# Patient Record
Sex: FEMALE | Race: WHITE | Employment: OTHER | URBAN - METROPOLITAN AREA
[De-identification: names, ages, dates, MRNs, and addresses within clinical notes are randomized per-mention and may not be internally consistent; named-entity substitution may affect disease eponyms.]

---

## 2019-02-26 ENCOUNTER — OFFICE VISIT (OUTPATIENT)
Dept: FAMILY MEDICINE CLINIC | Age: 44
End: 2019-02-26
Payer: COMMERCIAL

## 2019-02-26 VITALS
SYSTOLIC BLOOD PRESSURE: 132 MMHG | OXYGEN SATURATION: 99 % | DIASTOLIC BLOOD PRESSURE: 70 MMHG | WEIGHT: 267 LBS | HEART RATE: 74 BPM | HEIGHT: 64 IN | TEMPERATURE: 98.1 F | BODY MASS INDEX: 45.58 KG/M2

## 2019-02-26 DIAGNOSIS — Z79.899 CURRENT USE OF PROTON PUMP INHIBITOR: ICD-10-CM

## 2019-02-26 DIAGNOSIS — Z13.220 SCREENING FOR LIPID DISORDERS: ICD-10-CM

## 2019-02-26 DIAGNOSIS — Z12.39 SCREENING FOR BREAST CANCER: ICD-10-CM

## 2019-02-26 DIAGNOSIS — Z00.00 ROUTINE GENERAL MEDICAL EXAMINATION AT A HEALTH CARE FACILITY: ICD-10-CM

## 2019-02-26 DIAGNOSIS — Z12.4 SCREENING FOR CERVICAL CANCER: ICD-10-CM

## 2019-02-26 DIAGNOSIS — Z76.89 ESTABLISHING CARE WITH NEW DOCTOR, ENCOUNTER FOR: ICD-10-CM

## 2019-02-26 DIAGNOSIS — M06.9 RHEUMATOID ARTHRITIS INVOLVING MULTIPLE SITES, UNSPECIFIED RHEUMATOID FACTOR PRESENCE: Primary | ICD-10-CM

## 2019-02-26 DIAGNOSIS — E66.01 MORBID OBESITY WITH BMI OF 45.0-49.9, ADULT (HCC): ICD-10-CM

## 2019-02-26 PROCEDURE — G8417 CALC BMI ABV UP PARAM F/U: HCPCS | Performed by: NURSE PRACTITIONER

## 2019-02-26 PROCEDURE — 99214 OFFICE O/P EST MOD 30 MIN: CPT | Performed by: NURSE PRACTITIONER

## 2019-02-26 RX ORDER — HYDROCODONE BITARTRATE AND ACETAMINOPHEN 5; 325 MG/1; MG/1
1 TABLET ORAL EVERY 6 HOURS PRN
COMMUNITY

## 2019-02-26 RX ORDER — LANOLIN ALCOHOL/MO/W.PET/CERES
3 CREAM (GRAM) TOPICAL DAILY
COMMUNITY

## 2019-02-26 RX ORDER — DIPHENHYDRAMINE HCL 50 MG
50 CAPSULE ORAL EVERY 6 HOURS PRN
COMMUNITY

## 2019-02-26 RX ORDER — OMEPRAZOLE 20 MG/1
20 CAPSULE, DELAYED RELEASE ORAL DAILY
COMMUNITY

## 2019-02-26 RX ORDER — PREDNISONE 10 MG/1
10 TABLET ORAL DAILY
COMMUNITY

## 2019-02-26 RX ORDER — CELECOXIB 200 MG/1
200 CAPSULE ORAL 2 TIMES DAILY
COMMUNITY

## 2019-02-26 ASSESSMENT — ENCOUNTER SYMPTOMS
CONSTIPATION: 0
VOMITING: 0
COUGH: 0
CHEST TIGHTNESS: 0
DIARRHEA: 0
APNEA: 0
NAUSEA: 0
ABDOMINAL PAIN: 0
BLOOD IN STOOL: 0
SHORTNESS OF BREATH: 0

## 2019-02-26 ASSESSMENT — PATIENT HEALTH QUESTIONNAIRE - PHQ9
SUM OF ALL RESPONSES TO PHQ QUESTIONS 1-9: 0
2. FEELING DOWN, DEPRESSED OR HOPELESS: 0
SUM OF ALL RESPONSES TO PHQ QUESTIONS 1-9: 0
SUM OF ALL RESPONSES TO PHQ9 QUESTIONS 1 & 2: 0
1. LITTLE INTEREST OR PLEASURE IN DOING THINGS: 0

## 2019-03-07 LAB
ALBUMIN SERPL-MCNC: 4.1 G/DL
ALP BLD-CCNC: 67 U/L
ALT SERPL-CCNC: 13 U/L
ANION GAP SERPL CALCULATED.3IONS-SCNC: NORMAL MMOL/L
AST SERPL-CCNC: 16 U/L
BASOPHILS ABSOLUTE: NORMAL /ΜL
BASOPHILS RELATIVE PERCENT: NORMAL %
BILIRUB SERPL-MCNC: 0.9 MG/DL (ref 0.1–1.4)
BUN BLDV-MCNC: 13 MG/DL
C-REACTIVE PROTEIN: 1.2
CALCIUM SERPL-MCNC: 9.4 MG/DL
CHLORIDE BLD-SCNC: 104 MMOL/L
CHOLESTEROL, FASTING: 174
CO2: 26 MMOL/L
CREAT SERPL-MCNC: 0.54 MG/DL
EOSINOPHILS ABSOLUTE: NORMAL /ΜL
EOSINOPHILS RELATIVE PERCENT: NORMAL %
GFR CALCULATED: NORMAL
GLUCOSE BLD-MCNC: 103 MG/DL
HCT VFR BLD CALC: NORMAL % (ref 36–46)
HDLC SERPL-MCNC: 69 MG/DL (ref 35–70)
HEMOGLOBIN: NORMAL G/DL (ref 12–16)
LDL CHOLESTEROL CALCULATED: 85 MG/DL (ref 0–160)
LYMPHOCYTES ABSOLUTE: NORMAL /ΜL
LYMPHOCYTES RELATIVE PERCENT: NORMAL %
MAGNESIUM: 1.9 MG/DL
MCH RBC QN AUTO: NORMAL PG
MCHC RBC AUTO-ENTMCNC: NORMAL G/DL
MCV RBC AUTO: NORMAL FL
MONOCYTES ABSOLUTE: NORMAL /ΜL
MONOCYTES RELATIVE PERCENT: NORMAL %
NEUTROPHILS ABSOLUTE: NORMAL /ΜL
NEUTROPHILS RELATIVE PERCENT: NORMAL %
PLATELET # BLD: NORMAL K/ΜL
PMV BLD AUTO: NORMAL FL
POTASSIUM SERPL-SCNC: 4.1 MMOL/L
RBC # BLD: NORMAL 10^6/ΜL
RHEUMATOID FACTOR: 98
SEDIMENTATION RATE, ERYTHROCYTE: 41
SODIUM BLD-SCNC: 141 MMOL/L
TOTAL PROTEIN: 7.1
TRIGLYCERIDE, FASTING: 101
VITAMIN B-12: 321
WBC # BLD: NORMAL 10^3/ML

## 2019-03-10 DIAGNOSIS — Z13.220 SCREENING FOR LIPID DISORDERS: ICD-10-CM

## 2019-03-10 DIAGNOSIS — Z79.899 CURRENT USE OF PROTON PUMP INHIBITOR: ICD-10-CM

## 2019-03-10 DIAGNOSIS — Z00.00 ROUTINE GENERAL MEDICAL EXAMINATION AT A HEALTH CARE FACILITY: ICD-10-CM

## 2019-03-10 DIAGNOSIS — M06.9 RHEUMATOID ARTHRITIS INVOLVING MULTIPLE SITES, UNSPECIFIED RHEUMATOID FACTOR PRESENCE: ICD-10-CM

## 2019-03-12 ENCOUNTER — HOSPITAL ENCOUNTER (OUTPATIENT)
Dept: MAMMOGRAPHY | Age: 44
Discharge: HOME | End: 2019-03-14
Payer: COMMERCIAL

## 2019-03-12 DIAGNOSIS — Z12.39 SCREENING FOR BREAST CANCER: ICD-10-CM

## 2019-03-12 PROCEDURE — 77063 BREAST TOMOSYNTHESIS BI: CPT

## 2019-03-26 LAB — ANTIBODY: NORMAL

## 2019-05-14 ENCOUNTER — PATIENT MESSAGE (OUTPATIENT)
Dept: FAMILY MEDICINE CLINIC | Age: 44
End: 2019-05-14

## 2019-05-15 NOTE — TELEPHONE ENCOUNTER
From: Carol Thomson  To: Deedee Wooten, ARACELIS - CNP  Sent: 5/14/2019 7:36 PM EDT  Subject: Non-Urgent Medical Question    I need to see a doctor who can refill my prescription for hydrocodone.  Can you tell me what type of doctor would i need to see for this and if i need a referral?    Thank you,    Carol Thomson

## 2019-06-06 ENCOUNTER — PATIENT MESSAGE (OUTPATIENT)
Dept: FAMILY MEDICINE CLINIC | Age: 44
End: 2019-06-06

## 2019-06-06 NOTE — TELEPHONE ENCOUNTER
From: Donna Carney  To: Krista Gan, APRN - CNP  Sent: 6/6/2019 1:59 PM EDT  Subject: Non-Urgent Medical Question    I need help finding a doctor who can refill my pain medications. I currently have Tramadol and Hydrocodone. I will run out in July or August.     Can you refer me to the right doctor? My Rhuematologist always did these before, but my new one says to ask my primary for assistance.     Thank you,    Cherri Ricks

## 2019-06-06 NOTE — TELEPHONE ENCOUNTER
From: Solis Box  To: ARACELIS Farias CNP  Sent: 6/6/2019 2:27 PM EDT  Subject: Non-Urgent Medical Question    i do not know what to look for. I was told there are no pain management facilities covered. So I just need to go to a doctor that will be able to help me. I need the script so I will be able to continue vacuuming. At this point I will pay out of pocket to see a doctor. This insurance is useless     ----- Message -----  From: ARACELIS Farias CNP  Sent: 6/6/19, 2:20 PM  To: Solis Box  Subject: RE: Non-Urgent Medical Question    I will need you to call you insurance about whom is covered in [place] since you are more limited than others, please let me know and I will place a referral  olga    ----- Message -----   From: Solis Box   Sent: 6/6/2019 1:59 PM EDT   To: ARACELIS Farias CNP  Subject: Non-Urgent Medical Question    I need help finding a doctor who can refill my pain medications. I currently have Tramadol and Hydrocodone. I will run out in July or August.     Can you refer me to the right doctor? My Rhuematologist always did these before, but my new one says to ask my primary for assistance.     Thank you,    Aroldo Srivastava

## 2019-06-07 ENCOUNTER — PATIENT MESSAGE (OUTPATIENT)
Dept: FAMILY MEDICINE CLINIC | Age: 44
End: 2019-06-07

## 2019-07-08 ENCOUNTER — HOSPITAL ENCOUNTER (OUTPATIENT)
Age: 44
Setting detail: SPECIMEN
Discharge: HOME | End: 2019-07-08
Payer: COMMERCIAL

## 2019-07-08 LAB
ABSOLUTE EOS #: 0.19 K/UL (ref 0–0.44)
ABSOLUTE IMMATURE GRANULOCYTE: 0.05 K/UL (ref 0–0.3)
ABSOLUTE LYMPH #: 1.23 K/UL (ref 1.1–3.7)
ABSOLUTE MONO #: 0.71 K/UL (ref 0.1–1.2)
BASOPHILS # BLD: 1 % (ref 0–2)
BASOPHILS ABSOLUTE: 0.09 K/UL (ref 0–0.2)
DIFFERENTIAL TYPE: ABNORMAL
EOSINOPHILS RELATIVE PERCENT: 2 % (ref 1–4)
HCT VFR BLD CALC: 42 % (ref 36.3–47.1)
HEMOGLOBIN: 12.1 G/DL (ref 11.9–15.1)
IMMATURE GRANULOCYTES: 0 %
LYMPHOCYTES # BLD: 10 % (ref 24–43)
MCH RBC QN AUTO: 24.1 PG (ref 25.2–33.5)
MCHC RBC AUTO-ENTMCNC: 28.8 G/DL (ref 28.4–34.8)
MCV RBC AUTO: 83.7 FL (ref 82.6–102.9)
MONOCYTES # BLD: 6 % (ref 3–12)
NRBC AUTOMATED: 0 PER 100 WBC
PDW BLD-RTO: 16.2 % (ref 11.8–14.4)
PLATELET # BLD: 427 K/UL (ref 138–453)
PLATELET ESTIMATE: ABNORMAL
PMV BLD AUTO: 10.3 FL (ref 8.1–13.5)
RBC # BLD: 5.02 M/UL (ref 3.95–5.11)
RBC # BLD: ABNORMAL 10*6/UL
SEG NEUTROPHILS: 82 % (ref 36–65)
SEGMENTED NEUTROPHILS ABSOLUTE COUNT: 10.33 K/UL (ref 1.5–8.1)
WBC # BLD: 12.6 K/UL (ref 3.5–11.3)
WBC # BLD: ABNORMAL 10*3/UL

## 2019-07-09 LAB
ALBUMIN SERPL-MCNC: 4.2 G/DL (ref 3.5–5.2)
ALBUMIN/GLOBULIN RATIO: 1.3 (ref 1–2.5)
ALP BLD-CCNC: 92 U/L (ref 35–104)
ALT SERPL-CCNC: 19 U/L (ref 5–33)
ANION GAP SERPL CALCULATED.3IONS-SCNC: 12 MMOL/L (ref 9–17)
AST SERPL-CCNC: 22 U/L
BILIRUB SERPL-MCNC: 0.7 MG/DL (ref 0.3–1.2)
BUN BLDV-MCNC: 13 MG/DL (ref 6–20)
BUN/CREAT BLD: ABNORMAL (ref 9–20)
C-REACTIVE PROTEIN: 18.3 MG/L (ref 0–5)
CALCIUM SERPL-MCNC: 9.5 MG/DL (ref 8.6–10.4)
CHLORIDE BLD-SCNC: 100 MMOL/L (ref 98–107)
CO2: 25 MMOL/L (ref 20–31)
CREAT SERPL-MCNC: 0.42 MG/DL (ref 0.5–0.9)
GFR AFRICAN AMERICAN: >60 ML/MIN
GFR NON-AFRICAN AMERICAN: >60 ML/MIN
GFR SERPL CREATININE-BSD FRML MDRD: ABNORMAL ML/MIN/{1.73_M2}
GFR SERPL CREATININE-BSD FRML MDRD: ABNORMAL ML/MIN/{1.73_M2}
GLUCOSE BLD-MCNC: 75 MG/DL (ref 70–99)
POTASSIUM SERPL-SCNC: 3.9 MMOL/L (ref 3.7–5.3)
SODIUM BLD-SCNC: 137 MMOL/L (ref 135–144)
TOTAL PROTEIN: 7.4 G/DL (ref 6.4–8.3)

## 2021-12-13 ENCOUNTER — PATIENT MESSAGE (OUTPATIENT)
Dept: FAMILY MEDICINE CLINIC | Age: 46
End: 2021-12-13

## 2021-12-13 NOTE — TELEPHONE ENCOUNTER
From: Cleveland Kanner  To: Christianne Shannon  Sent: 12/13/2021 4:24 PM EST  Subject: Non-Urgent Medical Question    My current Rheumatologist whom I loved has moved. Can you give me a referral for the Rheumatology at [facility]? If I need to schedule a visit in order to get a new referral that is fine.     Thank Geoffrey Holter

## 2022-04-04 ENCOUNTER — OFFICE VISIT (OUTPATIENT)
Dept: FAMILY MEDICINE CLINIC | Age: 47
End: 2022-04-04

## 2022-04-04 VITALS
WEIGHT: 278.6 LBS | SYSTOLIC BLOOD PRESSURE: 116 MMHG | TEMPERATURE: 97.4 F | HEART RATE: 83 BPM | HEIGHT: 64 IN | OXYGEN SATURATION: 98 % | DIASTOLIC BLOOD PRESSURE: 80 MMHG | BODY MASS INDEX: 47.56 KG/M2

## 2022-04-04 DIAGNOSIS — M06.9 RHEUMATOID ARTHRITIS INVOLVING MULTIPLE SITES, UNSPECIFIED WHETHER RHEUMATOID FACTOR PRESENT (HCC): Primary | ICD-10-CM

## 2022-04-04 DIAGNOSIS — F33.0 MILD EPISODE OF RECURRENT MAJOR DEPRESSIVE DISORDER (HCC): ICD-10-CM

## 2022-04-04 PROBLEM — D50.8 IRON DEFICIENCY ANEMIA SECONDARY TO INADEQUATE DIETARY IRON INTAKE: Status: ACTIVE | Noted: 2019-04-03

## 2022-04-04 PROCEDURE — 99213 OFFICE O/P EST LOW 20 MIN: CPT | Performed by: NURSE PRACTITIONER

## 2022-04-04 RX ORDER — FLUOXETINE HYDROCHLORIDE 20 MG/1
20 CAPSULE ORAL DAILY
Qty: 30 CAPSULE | Refills: 5 | Status: SHIPPED | OUTPATIENT
Start: 2022-04-04 | End: 2022-10-11

## 2022-04-04 RX ORDER — LEFLUNOMIDE 20 MG/1
TABLET ORAL
COMMUNITY

## 2022-04-04 RX ORDER — HYDROXYCHLOROQUINE SULFATE 200 MG/1
TABLET, FILM COATED ORAL
COMMUNITY

## 2022-04-04 SDOH — ECONOMIC STABILITY: FOOD INSECURITY: WITHIN THE PAST 12 MONTHS, YOU WORRIED THAT YOUR FOOD WOULD RUN OUT BEFORE YOU GOT MONEY TO BUY MORE.: NEVER TRUE

## 2022-04-04 SDOH — ECONOMIC STABILITY: FOOD INSECURITY: WITHIN THE PAST 12 MONTHS, THE FOOD YOU BOUGHT JUST DIDN'T LAST AND YOU DIDN'T HAVE MONEY TO GET MORE.: NEVER TRUE

## 2022-04-04 ASSESSMENT — ENCOUNTER SYMPTOMS
CHEST TIGHTNESS: 0
DIARRHEA: 0
COUGH: 0
NAUSEA: 0
ABDOMINAL PAIN: 0
VOMITING: 0
CONSTIPATION: 0
SHORTNESS OF BREATH: 0

## 2022-04-04 ASSESSMENT — PATIENT HEALTH QUESTIONNAIRE - PHQ9
1. LITTLE INTEREST OR PLEASURE IN DOING THINGS: 0
SUM OF ALL RESPONSES TO PHQ QUESTIONS 1-9: 0
SUM OF ALL RESPONSES TO PHQ9 QUESTIONS 1 & 2: 0
2. FEELING DOWN, DEPRESSED OR HOPELESS: 0
SUM OF ALL RESPONSES TO PHQ QUESTIONS 1-9: 0

## 2022-04-04 ASSESSMENT — SOCIAL DETERMINANTS OF HEALTH (SDOH): HOW HARD IS IT FOR YOU TO PAY FOR THE VERY BASICS LIKE FOOD, HOUSING, MEDICAL CARE, AND HEATING?: NOT HARD AT ALL

## 2022-04-04 NOTE — PROGRESS NOTES
Visit Information    Have you changed or started any medications since your last visit including any over-the-counter medicines, vitamins, or herbal medicines? no   Have you stopped taking any of your medications? Is so, why? -  no  Are you having any side effects from any of your medications? - no    Have you seen any other physician or provider since your last visit?  no   Have you had any other diagnostic tests since your last visit?  no   Have you been seen in the emergency room and/or had an admission in a hospital since we last saw you?  no   Have you had your routine dental cleaning in the past 6 months?  no     Do you have an active MyChart account? If no, what is the barrier?   Yes    Patient Care Team:  ARACELIS Medina CNP as PCP - General (Family Nurse Practitioner)  ARACELIS [facility]ina C[facility]    Medical History Review  Past Medical, Family, and Social History reviewed and  contribute to the patient presenting condition    Health Maintenance   Topic Date Due    Hepatitis C screen  Never done    COVID-19 Vaccine (1) Never done    Depression Screen  Never done    HIV screen  Never done    Cervical cancer screen  Never done    Colorectal Cancer Screen  Never done    Lipid screen  03/07/2024    DTaP/Tdap/Td vaccine (2 - Td or Tdap) 02/26/2027    Hepatitis A vaccine  Aged Out    Hepatitis B vaccine  Aged Out    Hib vaccine  Aged Out    Meningococcal (ACWY) vaccine  Aged Out    Pneumococcal 0-64 years Vaccine  Aged Out

## 2022-04-04 NOTE — PROGRESS NOTES
[address]  [place], [address]  [phone]      Akira Gonzalez is a 51 y.o. female who presents today for her  medicalconditions/complaints as noted below. Akira Gonzalez is c/o of Arthritis (req ref to rheum,old one moved to [place] [address], req handicap placard)  . HPI:    HPI  Pt here today for f/u on RA> states her recent rheumatology MD has moved and needs new referral. She does not want to drive to [facility] if able. She has been stable on all meds with no SE and feeling ok overall. She does struggle with mild depression, lack of motivation and often does not want to get out of bed. No triggers or cause for this but does have hx of depression > 10 years ago and did well with prozac and would like to try again for a few months. She is also more irritable and mai but aware she is getting closer to menopause. Past Medical History:   Diagnosis Date    Rheumatoid arthritis ([facility] [address] 2008    Rheumatoid arthritis involving multiple sites ([facility] [address] 2/26/2019      Past Surgical History:   Procedure Laterality Date    CYST REMOVAL      2000, 1984     Family History   Problem Relation Age of Onset    No Known Problems Mother     No Known Problems Father     Heart Disease Paternal Uncle      Social History     Tobacco Use    Smoking status: Never Smoker    Smokeless tobacco: Never Used   Substance Use Topics    Alcohol use: Yes      Current Outpatient Medications   Medication Sig Dispense Refill    hydroxychloroquine (PLAQUENIL) 200 MG tablet Plaquenil 200 mg tablet   Take 2 tablets every day by oral route.  leflunomide (ARAVA) 20 MG tablet leflunomide 20 mg tablet   Take 1 tablet every day by oral route.       Ferrous Sulfate (IRON PO) Take by mouth in the morning and at bedtime      FLUoxetine (PROZAC) 20 MG capsule Take 1 capsule by mouth daily 30 capsule 5    predniSONE (DELTASONE) 10 MG tablet Take 10 mg by mouth daily      celecoxib (CELEBREX) 200 MG capsule Take 200 mg by mouth 2 times daily      methotrexate (RHEUMATREX) 15 MG chemo tablet Take by mouth three times a week      Methylsulfonylmethane (MSM PO) Take by mouth      Multiple Vitamins-Minerals (MULTIVITAMIN ADULTS PO) Take by mouth      diphenhydrAMINE (BENADRYL) 50 MG capsule Take 50 mg by mouth every 6 hours as needed for Itching      omeprazole (PRILOSEC) 20 MG delayed release capsule Take 20 mg by mouth daily      melatonin 3 MG TABS tablet Take 3 mg by mouth daily      HYDROcodone-acetaminophen (NORCO) 5-325 MG per tablet Take 1 tablet by mouth every 6 hours as needed for Pain. .       No current facility-administered medications for this visit. Allergies   Allergen Reactions    Cephalexin Hives, Itching and Swelling    Influenza Vaccines      On immunosuppresents    Other Hives     Cannot use Band-aid brands      Sulfa Antibiotics Hives, Itching and Swelling       Health Maintenance   Topic Date Due    Hepatitis C screen  Never done    COVID-19 Vaccine (1) Never done    Depression Monitoring  Never done    HIV screen  Never done    Cervical cancer screen  Never done    Colorectal Cancer Screen  Never done    Lipid screen  03/07/2024    DTaP/Tdap/Td vaccine (2 - Td or Tdap) 02/26/2027    Hepatitis A vaccine  Aged Out    Hepatitis B vaccine  Aged Out    Hib vaccine  Aged Out    Meningococcal (ACWY) vaccine  Aged Out    Pneumococcal 0-64 years Vaccine  Aged Out       Subjective:      Review of Systems   Constitutional: Positive for fatigue (sleeping well overall, just unmotivated). Negative for appetite change, chills, diaphoresis and fever. Eyes: Negative for visual disturbance. Respiratory: Negative for cough, chest tightness and shortness of breath. Cardiovascular: Negative for chest pain, palpitations and leg swelling. Gastrointestinal: Negative for abdominal pain, constipation, diarrhea, nausea and vomiting.    Genitourinary: Negative for difficulty urinating and menstrual problem. Musculoskeletal: Positive for arthralgias and gait problem. Skin: Negative for rash. Neurological: Negative for dizziness and headaches. Hematological: Negative for adenopathy. Psychiatric/Behavioral: Positive for dysphoric mood. Negative for self-injury, sleep disturbance and suicidal ideas. The patient is not nervous/anxious. Objective:      Physical Exam  Vitals and nursing note reviewed. Constitutional:       General: She is not in acute distress. Appearance: Normal appearance. She is well-developed. She is obese. She is not ill-appearing or diaphoretic. HENT:      Head: Normocephalic and atraumatic. Eyes:      Conjunctiva/sclera: Conjunctivae normal.   Cardiovascular:      Rate and Rhythm: Normal rate and regular rhythm. Heart sounds: Normal heart sounds. Comments: No LE edema  Pulmonary:      Effort: Pulmonary effort is normal.      Breath sounds: Normal breath sounds. Musculoskeletal:      Cervical back: Neck supple. Skin:     General: Skin is warm and dry. Neurological:      General: No focal deficit present. Mental Status: She is alert and oriented to person, place, and time. Mental status is at baseline. Psychiatric:         Mood and Affect: Mood normal.         Behavior: Behavior normal.         Thought Content: Thought content normal.         Judgment: Judgment normal.         Assessment:       Diagnosis Orders   1. Rheumatoid arthritis involving multiple sites, unspecified whether rheumatoid factor present [facility])  External Referral To Rheumatology   2. Mild episode of recurrent major depressive disorder (HCC)  FLUoxetine (PROZAC) 20 MG capsule     Wt Readings from Last 3 Encounters:   04/04/22 278 lb 9.6 oz (126.4 kg)   02/26/19 267 lb (121.1 kg)     BP Readings from Last 3 Encounters:   04/04/22 116/80   02/26/19 132/70       Plan      Return if symptoms worsen or fail to improve.   Orders Placed This Encounter   Procedures    External Referral To Rheumatology     Referral Priority:   Routine     Referral Type:   Eval and Treat     Referral Reason:   Specialty Services Required     Referred to Provider:   Chente Elam MD     Requested Specialty:   Rheumatology     Number of Visits Requested:   1     Orders Placed This Encounter   Medications    FLUoxetine (PROZAC) 20 MG capsule     Sig: Take 1 capsule by mouth daily     Dispense:  30 capsule     Refill:  5   declines HM screening d/t no insurance and also labs  New referral given for rheumatology, was not happy at [facility] clinic recently  Will trial prozac again for mild depression, advised regular exercise as able, spending more time with friends/family and also colorful diet/suinlight when able  Call INB or worsening or not tolerating      Patient given educational materials - see patient instructions. Discussed use,benefit, and side effects of prescribed medications. All patient questions answered. Pt voiced understanding. Reviewed health maintenance. Instructed to continue currentmedications, diet and exercise.     Electronically signed by ARACELIS Dozier CNP, CNP on 4/4/2022 at 12:54 PM

## 2022-07-26 ENCOUNTER — PATIENT MESSAGE (OUTPATIENT)
Dept: FAMILY MEDICINE CLINIC | Age: 47
End: 2022-07-26

## 2022-07-26 DIAGNOSIS — K08.89 PAIN, DENTAL: Primary | ICD-10-CM

## 2022-07-26 RX ORDER — CLINDAMYCIN HYDROCHLORIDE 300 MG/1
300 CAPSULE ORAL 2 TIMES DAILY
Qty: 14 CAPSULE | Refills: 0 | Status: SHIPPED | OUTPATIENT
Start: 2022-07-26 | End: 2022-08-02

## 2022-07-26 NOTE — TELEPHONE ENCOUNTER
From: Katrina Johnson  To: Nohemi Rojas  Sent: 7/26/2022 1:24 PM EDT  Subject: Prescription needed    I broke a tooth last month and woke up today with facial swelling. The dentist cannot get me in until Friday. Can I make an appointment with you to get antibiotics for this? Thank you!

## 2022-09-12 ENCOUNTER — PATIENT MESSAGE (OUTPATIENT)
Dept: FAMILY MEDICINE CLINIC | Age: 47
End: 2022-09-12

## 2022-09-12 DIAGNOSIS — U07.1 COVID-19: Primary | ICD-10-CM

## 2022-09-12 RX ORDER — NIRMATRELVIR AND RITONAVIR 300-100 MG
KIT ORAL
Qty: 30 TABLET | Refills: 0 | Status: SHIPPED | OUTPATIENT
Start: 2022-09-12 | End: 2022-09-17

## 2022-09-12 NOTE — TELEPHONE ENCOUNTER
From: Mariposa Dickson  To: Cathie Garcia  Sent: 9/12/2022 12:23 PM EDT  Subject: Covid medication     I tested positive for Covid this morning. Can I get a prescription for one of the new medications since I am immune compromised? Symptoms started Saturday but the test was negative then. Thank you!     Mariposa Dickson

## 2022-10-11 DIAGNOSIS — F33.0 MILD EPISODE OF RECURRENT MAJOR DEPRESSIVE DISORDER (HCC): ICD-10-CM

## 2022-10-11 RX ORDER — FLUOXETINE HYDROCHLORIDE 20 MG/1
CAPSULE ORAL
Qty: 30 CAPSULE | Refills: 5 | Status: SHIPPED | OUTPATIENT
Start: 2022-10-11

## 2022-11-22 DIAGNOSIS — K08.89 PAIN, DENTAL: ICD-10-CM

## 2022-11-22 RX ORDER — CLINDAMYCIN HYDROCHLORIDE 300 MG/1
CAPSULE ORAL
Qty: 14 CAPSULE | Refills: 0 | OUTPATIENT
Start: 2022-11-22

## 2023-02-17 ENCOUNTER — COMMUNITY OUTREACH (OUTPATIENT)
Dept: FAMILY MEDICINE CLINIC | Age: 48
End: 2023-02-17

## 2023-02-17 NOTE — PROGRESS NOTES
Patient's HM shows they are overdue for HIV Screening,  Hepatitis C Screening, Colorectal Screening and Cervical Cancer Screening. Care Everywhere and  files searched.  updated with HIV and Hep C results.

## 2023-05-02 DIAGNOSIS — F33.0 MILD EPISODE OF RECURRENT MAJOR DEPRESSIVE DISORDER (HCC): ICD-10-CM

## 2023-05-02 RX ORDER — FLUOXETINE HYDROCHLORIDE 20 MG/1
CAPSULE ORAL
Qty: 30 CAPSULE | Refills: 0 | Status: SHIPPED | OUTPATIENT
Start: 2023-05-02

## 2023-06-21 DIAGNOSIS — F33.0 MILD EPISODE OF RECURRENT MAJOR DEPRESSIVE DISORDER (HCC): ICD-10-CM

## 2023-06-21 RX ORDER — FLUOXETINE HYDROCHLORIDE 20 MG/1
CAPSULE ORAL
Qty: 30 CAPSULE | Refills: 0 | Status: SHIPPED | OUTPATIENT
Start: 2023-06-21

## 2024-02-29 ENCOUNTER — OFFICE VISIT (OUTPATIENT)
Dept: FAMILY MEDICINE CLINIC | Age: 49
End: 2024-02-29
Payer: COMMERCIAL

## 2024-02-29 VITALS
BODY MASS INDEX: 50.02 KG/M2 | TEMPERATURE: 97.2 F | DIASTOLIC BLOOD PRESSURE: 86 MMHG | SYSTOLIC BLOOD PRESSURE: 138 MMHG | HEIGHT: 64 IN | HEART RATE: 84 BPM | WEIGHT: 293 LBS | OXYGEN SATURATION: 98 %

## 2024-02-29 DIAGNOSIS — M06.9 RHEUMATOID ARTHRITIS INVOLVING MULTIPLE SITES, UNSPECIFIED WHETHER RHEUMATOID FACTOR PRESENT (HCC): ICD-10-CM

## 2024-02-29 DIAGNOSIS — J30.81 CAT ALLERGIES: Primary | ICD-10-CM

## 2024-02-29 DIAGNOSIS — Z12.11 SCREEN FOR COLON CANCER: ICD-10-CM

## 2024-02-29 DIAGNOSIS — L50.9 HIVES: ICD-10-CM

## 2024-02-29 DIAGNOSIS — Z13.220 SCREENING FOR LIPOID DISORDERS: ICD-10-CM

## 2024-02-29 DIAGNOSIS — Z13.1 SCREENING FOR DIABETES MELLITUS: ICD-10-CM

## 2024-02-29 PROCEDURE — 99213 OFFICE O/P EST LOW 20 MIN: CPT | Performed by: NURSE PRACTITIONER

## 2024-02-29 RX ORDER — PREDNISONE 5 MG/1
5 TABLET ORAL DAILY
Qty: 30 TABLET | Refills: 0 | Status: SHIPPED | OUTPATIENT
Start: 2024-02-29 | End: 2024-03-30

## 2024-02-29 RX ORDER — FAMOTIDINE 40 MG/1
40 TABLET, FILM COATED ORAL EVERY EVENING
Qty: 30 TABLET | Refills: 0 | Status: SHIPPED | OUTPATIENT
Start: 2024-02-29

## 2024-02-29 RX ORDER — FLUTICASONE FUROATE 27.5 UG/1
SPRAY, METERED NASAL
COMMUNITY
Start: 2023-08-19

## 2024-02-29 RX ORDER — FEXOFENADINE HCL 180 MG/1
TABLET ORAL
COMMUNITY
Start: 2023-08-12

## 2024-02-29 ASSESSMENT — ENCOUNTER SYMPTOMS
COUGH: 0
CHEST TIGHTNESS: 0
NAUSEA: 0
ABDOMINAL PAIN: 0
VOMITING: 0
SHORTNESS OF BREATH: 0

## 2024-02-29 NOTE — PROGRESS NOTES
Visit Information    Have you changed or started any medications since your last visit including any over-the-counter medicines, vitamins, or herbal medicines? no   Have you stopped taking any of your medications? Is so, why? -  no  Are you having any side effects from any of your medications? - no    Have you seen any other physician or provider since your last visit?  no   Have you had any other diagnostic tests since your last visit?  no   Have you been seen in the emergency room and/or had an admission in a hospital since we last saw you?  no   Have you had your routine dental cleaning in the past 6 months?  no     Do you have an active MyChart account? If no, what is the barrier?  Yes    Patient Care Team:  Raiza Shipman APRN - CNP as PCP - General (Family Nurse Practitioner)  Raiza Shipman APRN - CNP as PCP - Empaneled Provider    Medical History Review  Past Medical, Family, and Social History reviewed and  contribute to the patient presenting condition    Health Maintenance   Topic Date Due    Hepatitis B vaccine (1 of 3 - 3-dose series) Never done    COVID-19 Vaccine (1) Never done    Cervical cancer screen  Never done    Colorectal Cancer Screen  Never done    Depression Monitoring  04/04/2023    Lipids  03/07/2024    DTaP/Tdap/Td vaccine (2 - Td or Tdap) 02/26/2027    Hepatitis C screen  Completed    HIV screen  Completed    Hepatitis A vaccine  Aged Out    Hib vaccine  Aged Out    Polio vaccine  Aged Out    Meningococcal (ACWY) vaccine  Aged Out    Pneumococcal 0-64 years Vaccine  Aged Out    Depression Screen  Discontinued             
Date:   2/28/2025     Order Specific Question:   Is Patient Fasting?/# of Hours     Answer:   8-12 HOURS    CBC with Auto Differential     Standing Status:   Future     Standing Expiration Date:   2/28/2025    Comprehensive Metabolic Panel     Standing Status:   Future     Standing Expiration Date:   2/28/2025    Hemoglobin A1C     Standing Status:   Future     Standing Expiration Date:   2/28/2025    [facility] - Galen Pacheco DO, Rheumatology, [place]     Referral Priority:   Routine     Referral Type:   Eval and Treat     Referral Reason:   Specialty Services Required     Referred to Provider:   Galen Pacheco DO     Requested Specialty:   Rheumatology     Number of Visits Requested:   1     Orders Placed This Encounter   Medications    predniSONE (DELTASONE) 5 MG tablet     Sig: Take 1 tablet by mouth daily     Dispense:  30 tablet     Refill:  0    famotidine (PEPCID) 40 MG tablet     Sig: Take 1 tablet by mouth every evening     Dispense:  30 tablet     Refill:  0   Hives:  Start pepcid, continue allergra Hives, benadryl and ok for oatmeal baths  Clean all bedding   Restart prednisone for RA/hives and new referral given for rheum consult  Update new labs  Will call with test results  Send back coluard kit asap      Patient given educational materials - see patient instructions.  Discussed use,benefit, and side effects of prescribed medications.  All patient questions answered.Pt voiced understanding. Reviewed health maintenance.  Instructed to continue currentmedications, diet and exercise.    Electronically signed by ARACELIS Solorzano CNP,CNP on 2/29/2024 at 11:53 AM

## 2024-04-02 ENCOUNTER — PATIENT MESSAGE (OUTPATIENT)
Dept: FAMILY MEDICINE CLINIC | Age: 49
End: 2024-04-02

## 2024-04-02 RX ORDER — PENICILLIN V POTASSIUM 500 MG/1
500 TABLET ORAL
Qty: 20 TABLET | Refills: 0 | Status: SHIPPED | OUTPATIENT
Start: 2024-04-02 | End: 2024-04-12

## 2024-04-02 NOTE — TELEPHONE ENCOUNTER
From: Yadi Vallejo  To: Raiza Shipman  Sent: 4/2/2024 8:13 AM EDT  Subject: Prescription needed please    I woke up this morning with facial swelling due to a tooth that broke this weekend. I’m going to [place] tomorrow for a wedding and my dentist cannot see me until I get back. Can you please send me a prescription to my [facility]?    Thank you,    Yadi

## 2024-06-23 ENCOUNTER — PATIENT MESSAGE (OUTPATIENT)
Dept: FAMILY MEDICINE CLINIC | Age: 49
End: 2024-06-23

## 2025-03-19 ENCOUNTER — E-VISIT (OUTPATIENT)
Dept: FAMILY MEDICINE CLINIC | Age: 50
End: 2025-03-19

## 2025-03-19 DIAGNOSIS — U07.1 COVID-19: Primary | ICD-10-CM

## 2025-03-19 PROCEDURE — 99421 OL DIG E/M SVC 5-10 MIN: CPT | Performed by: NURSE PRACTITIONER

## 2025-03-19 ASSESSMENT — LIFESTYLE VARIABLES: SMOKING_STATUS: NO, I HAVE NEVER SMOKED

## 2025-05-07 ENCOUNTER — E-VISIT (OUTPATIENT)
Dept: FAMILY MEDICINE CLINIC | Age: 50
End: 2025-05-07

## 2025-05-07 DIAGNOSIS — U07.1 COVID-19: Primary | ICD-10-CM

## 2025-05-07 PROCEDURE — 99421 OL DIG E/M SVC 5-10 MIN: CPT | Performed by: NURSE PRACTITIONER

## 2025-05-07 ASSESSMENT — LIFESTYLE VARIABLES: SMOKING_STATUS: NO, I HAVE NEVER SMOKED

## 2025-08-19 ENCOUNTER — E-VISIT (OUTPATIENT)
Dept: FAMILY MEDICINE CLINIC | Age: 50
End: 2025-08-19

## 2025-08-19 DIAGNOSIS — R39.9 UTI SYMPTOMS: Primary | ICD-10-CM

## 2025-08-19 PROCEDURE — 99421 OL DIG E/M SVC 5-10 MIN: CPT | Performed by: NURSE PRACTITIONER

## 2025-08-19 RX ORDER — NITROFURANTOIN 25; 75 MG/1; MG/1
100 CAPSULE ORAL 2 TIMES DAILY
Qty: 20 CAPSULE | Refills: 0 | Status: SHIPPED | OUTPATIENT
Start: 2025-08-19 | End: 2025-08-29